# Patient Record
(demographics unavailable — no encounter records)

---

## 2025-03-25 NOTE — HISTORY OF PRESENT ILLNESS
[FreeTextEntry1] : Has rising PSA (7.2); to follow up with  (has appointment for MRI; was biopsied in the past)  Has changes of color in BMs; no BRBPR, no melena Regular BMs otherwise  Has had GERD on and off - has some known triggers (coffee) Cough is a GERD correlate for him Recently doing well Takes TUMS prn  Exercises daily - cardio

## 2025-03-25 NOTE — ASSESSMENT
[FreeTextEntry1] : Change in bowel habits Last colonoscopy 5 years ago Discussed risks of the procedure -  including but not limited to bleeding, infection, drug reaction, perforation and missed lesion.  Also discussed benefits and alternatives of this procedure with patient - including no treatment  - and the patient consents to undergoing the procedure.   GERD  Can add EGD at time of upcoming colonoscopy in order to evaluate upper GI tract, r/o Chin's